# Patient Record
Sex: FEMALE | Race: ASIAN | URBAN - METROPOLITAN AREA
[De-identification: names, ages, dates, MRNs, and addresses within clinical notes are randomized per-mention and may not be internally consistent; named-entity substitution may affect disease eponyms.]

---

## 2022-01-21 ENCOUNTER — LAB OUTSIDE AN ENCOUNTER (OUTPATIENT)
Dept: URBAN - METROPOLITAN AREA CLINIC 33 | Facility: CLINIC | Age: 57
End: 2022-01-21

## 2022-01-21 ENCOUNTER — OFFICE VISIT (OUTPATIENT)
Dept: URBAN - METROPOLITAN AREA CLINIC 33 | Facility: CLINIC | Age: 57
End: 2022-01-21
Payer: COMMERCIAL

## 2022-01-21 VITALS
HEART RATE: 81 BPM | BODY MASS INDEX: 26.98 KG/M2 | WEIGHT: 158 LBS | OXYGEN SATURATION: 99 % | DIASTOLIC BLOOD PRESSURE: 92 MMHG | HEIGHT: 64 IN | SYSTOLIC BLOOD PRESSURE: 138 MMHG

## 2022-01-21 DIAGNOSIS — K21.9 GASTROESOPHAGEAL REFLUX DISEASE WITHOUT ESOPHAGITIS: ICD-10-CM

## 2022-01-21 DIAGNOSIS — Z12.11 COLON CANCER SCREENING: ICD-10-CM

## 2022-01-21 DIAGNOSIS — Z80.0 FAMILY HISTORY OF COLON CANCER: ICD-10-CM

## 2022-01-21 PROCEDURE — 99203 OFFICE O/P NEW LOW 30 MIN: CPT | Performed by: INTERNAL MEDICINE

## 2022-01-21 RX ORDER — LOSARTAN POTASSIUM 50 MG/1
1 TABLET TABLET, FILM COATED ORAL ONCE A DAY
Status: ACTIVE | COMMUNITY

## 2022-01-21 RX ORDER — SODIUM, POTASSIUM,MAG SULFATES 17.5-3.13G
177ML SOLUTION, RECONSTITUTED, ORAL ORAL
Qty: 354 MILLILITER | Refills: 0 | OUTPATIENT
Start: 2022-01-21 | End: 2022-01-23

## 2022-01-21 RX ORDER — AMLODIPINE BESYLATE 5 MG/1
1 TABLET TABLET ORAL ONCE A DAY
Status: ACTIVE | COMMUNITY

## 2022-01-21 NOTE — HPI-SURVEILLANCE COLONOSCOPY
55 y/o female patient presents today for a consultation for a surveillance colonoscopy. Patient currently admits 1 bowel movements per day with no melena, mucus  or blood in stools.  Patient denies abdominal pain, bloating, or gas, but admits heartburn.   Patient admits past colonoscopy in 05/16/2016 performed in [place], which resulted in findings of diverticulosis and internal hemorrhoids.   Patient admits a family hx of colon cancer (sister and mother).

## 2022-01-21 NOTE — HPI-ACID REFLUX
55 y/o female patient admits the continuous symptoms of longstanding history of acid reflux. The symptoms are described as a burning sensation and is located in the epigastric region. Patient states that she has not tried any medications. Patient admits occasional indigestion, bloating/gas, and changes in bowel habits, but denies dysphagia, excessive belching, globus, sour eructations, early satiety, changes in appetite, coughing, and abdominal/epigastric pain. Patient states that she has not had any recent changes in her medications.  Patient denies family hx of gastric/esophageal cancer or diseases. Patient admits past EGD which was performed in 05/16/2016 with findings of erosive gastroduodenitis.

## 2022-02-14 PROBLEM — 266435005: Status: ACTIVE | Noted: 2022-01-21

## 2022-02-16 ENCOUNTER — OFFICE VISIT (OUTPATIENT)
Dept: URBAN - METROPOLITAN AREA MEDICAL CENTER 10 | Facility: MEDICAL CENTER | Age: 57
End: 2022-02-16
Payer: COMMERCIAL

## 2022-02-16 DIAGNOSIS — K29.60 ADENOPAPILLOMATOSIS GASTRICA: ICD-10-CM

## 2022-02-16 DIAGNOSIS — B96.81 BACTERIAL INFECTION DUE TO H. PYLORI: ICD-10-CM

## 2022-02-16 DIAGNOSIS — K21.9 ACID REFLUX: ICD-10-CM

## 2022-02-16 PROCEDURE — G0121 COLON CA SCRN NOT HI RSK IND: HCPCS | Performed by: INTERNAL MEDICINE

## 2022-02-16 PROCEDURE — 43239 EGD BIOPSY SINGLE/MULTIPLE: CPT | Performed by: INTERNAL MEDICINE

## 2022-03-14 ENCOUNTER — WEB ENCOUNTER (OUTPATIENT)
Dept: URBAN - METROPOLITAN AREA CLINIC 35 | Facility: CLINIC | Age: 57
End: 2022-03-14

## 2022-03-17 ENCOUNTER — DASHBOARD ENCOUNTERS (OUTPATIENT)
Age: 57
End: 2022-03-17

## 2022-03-17 ENCOUNTER — OFFICE VISIT (OUTPATIENT)
Dept: URBAN - METROPOLITAN AREA CLINIC 35 | Facility: CLINIC | Age: 57
End: 2022-03-17
Payer: COMMERCIAL

## 2022-03-17 VITALS
OXYGEN SATURATION: 97 % | HEART RATE: 75 BPM | HEIGHT: 64 IN | SYSTOLIC BLOOD PRESSURE: 120 MMHG | BODY MASS INDEX: 26.63 KG/M2 | WEIGHT: 156 LBS | DIASTOLIC BLOOD PRESSURE: 78 MMHG

## 2022-03-17 DIAGNOSIS — A04.8 H. PYLORI INFECTION: ICD-10-CM

## 2022-03-17 DIAGNOSIS — K20.90 ESOPHAGITIS: ICD-10-CM

## 2022-03-17 DIAGNOSIS — K29.70 GASTRITIS WITHOUT BLEEDING, UNSPECIFIED CHRONICITY, UNSPECIFIED GASTRITIS TYPE: ICD-10-CM

## 2022-03-17 PROCEDURE — 99214 OFFICE O/P EST MOD 30 MIN: CPT | Performed by: INTERNAL MEDICINE

## 2022-03-17 RX ORDER — AMLODIPINE BESYLATE 5 MG/1
1 TABLET TABLET ORAL ONCE A DAY
Status: ACTIVE | COMMUNITY

## 2022-03-17 RX ORDER — LOSARTAN POTASSIUM 50 MG/1
1 TABLET TABLET, FILM COATED ORAL ONCE A DAY
Status: ACTIVE | COMMUNITY

## 2022-03-17 RX ORDER — CLARITHROMYCIN 500 MG/1
1 TABLET TABLET, FILM COATED ORAL BID
Qty: 28 TABLET | Refills: 0 | OUTPATIENT
Start: 2022-03-17 | End: 2022-03-31

## 2022-03-17 RX ORDER — AMOXICILLIN 500 MG/1
2 CAPSULES CAPSULE ORAL BID
Qty: 56 CAPSULE | Refills: 0 | OUTPATIENT
Start: 2022-03-17 | End: 2022-03-31

## 2022-03-17 RX ORDER — OMEPRAZOLE 40 MG/1
1 CAPSULE 30 MINUTES BEFORE MORNING MEAL CAPSULE, DELAYED RELEASE ORAL BID
Qty: 28 | Refills: 0 | OUTPATIENT
Start: 2022-03-17

## 2022-03-17 RX ORDER — METRONIDAZOLE 500 MG/1
1 TABLET TABLET ORAL BID
Qty: 28 TABLET | Refills: 0 | OUTPATIENT
Start: 2022-03-17 | End: 2022-03-31

## 2022-03-17 NOTE — HPI-SURVEILLANCE COLONOSCOPY
Last visit (01/21/2022) 55 y/o female patient presents today for a consultation for a surveillance colonoscopy. Patient currently admits 1 bowel movements per day with no melena, mucus  or blood in stools.  Patient denies abdominal pain, bloating, or gas, but admits heartburn.   Patient admits past colonoscopy in 05/16/2016 performed in [place], which resulted in findings of diverticulosis and internal hemorrhoids.   Patient admits a family hx of colon cancer (sister and mother).

## 2022-03-17 NOTE — HPI-TODAY'S VISIT:
55 y/o female patient presents today for follow-up to her colonoscopy and EGD, which was completed on (02/16/2022)  by Dr. Ismael Morgan. Patient denies any complications after her procedure. Since the procedure, the patient denies dysphagia, heartburn globus, changes in appetite,  or abdominal/epigastric pain .  Patient currently admits 1 formed bowel movements every other day with strain. Patient notes that she she takes Ducolax with some relief of symptoms. Patient denies any melena, blood or mucus in stools.  Colonoscopy report shows: -Preparation of the colon was fair. -Diverticulosis in the sigmoid colon, in the descending colon, in the transverse colon and in the ascending colon. -The examination was otherwise normal on direct and retroflexion views.   EGD report shows: -LA Grade A reflux esophagitis. -Gastritis. -Non-bleeding gastric ulcers with no stigmata of bleeding. -Normal examined duodenum.

## 2022-03-17 NOTE — HPI-ACID REFLUX
Patient continues to experienceacid reflux and related symptoms.    Patient admits occasional bloating and gas. Patient denies indigestion any longer.  Patient currently denies taking any medications for relief of symptoms.      Last visit (01/21/2022) 57 y/o female patient admits the continuous symptoms of longstanding history of acid reflux. The symptoms are described as a burning sensation and is located in the epigastric region. Patient states that she has not tried any medications. Patient admits occasional indigestion, bloating/gas, and changes in bowel habits, but denies dysphagia, excessive belching, globus, sour eructations, early satiety, changes in appetite, coughing, and abdominal/epigastric pain. Patient states that she has not had any recent changes in her medications.  Patient denies family hx of gastric/esophageal cancer or diseases. Patient admits past EGD which was performed in 05/16/2016 with findings of erosive gastroduodenitis.

## 2022-03-21 PROBLEM — 4556007: Status: ACTIVE | Noted: 2022-03-21
